# Patient Record
Sex: FEMALE | Race: WHITE | Employment: UNEMPLOYED | URBAN - METROPOLITAN AREA
[De-identification: names, ages, dates, MRNs, and addresses within clinical notes are randomized per-mention and may not be internally consistent; named-entity substitution may affect disease eponyms.]

---

## 2017-09-05 ENCOUNTER — HOSPITAL ENCOUNTER (OUTPATIENT)
Dept: MAMMOGRAPHY | Age: 42
Discharge: HOME | End: 2017-09-05
Payer: COMMERCIAL

## 2017-09-05 DIAGNOSIS — M85.80 OSTEOPENIA: ICD-10-CM

## 2017-09-05 PROCEDURE — 77080 DXA BONE DENSITY AXIAL: CPT

## 2022-01-31 LAB — HBA1C MFR BLD HPLC: 5.4 %

## 2022-10-19 ENCOUNTER — OFFICE VISIT (OUTPATIENT)
Dept: CARDIOLOGY CLINIC | Age: 47
End: 2022-10-19
Payer: COMMERCIAL

## 2022-10-19 ENCOUNTER — TELEPHONE (OUTPATIENT)
Dept: CARDIOLOGY CLINIC | Age: 47
End: 2022-10-19

## 2022-10-19 VITALS
HEIGHT: 68 IN | DIASTOLIC BLOOD PRESSURE: 70 MMHG | OXYGEN SATURATION: 98 % | WEIGHT: 159 LBS | RESPIRATION RATE: 14 BRPM | HEART RATE: 64 BPM | SYSTOLIC BLOOD PRESSURE: 102 MMHG | BODY MASS INDEX: 24.1 KG/M2

## 2022-10-19 DIAGNOSIS — R00.0 TACHYCARDIA: Primary | ICD-10-CM

## 2022-10-19 DIAGNOSIS — C50.919 MALIGNANT NEOPLASM OF FEMALE BREAST, UNSPECIFIED ESTROGEN RECEPTOR STATUS, UNSPECIFIED LATERALITY, UNSPECIFIED SITE OF BREAST (HCC): ICD-10-CM

## 2022-10-19 PROCEDURE — 93000 ELECTROCARDIOGRAM COMPLETE: CPT | Performed by: INTERNAL MEDICINE

## 2022-10-19 PROCEDURE — 99204 OFFICE O/P NEW MOD 45 MIN: CPT | Performed by: INTERNAL MEDICINE

## 2022-10-19 RX ORDER — MULTIVIT-MIN/FOLIC/VIT K/LYCOP 400-300MCG
1 TABLET ORAL DAILY
COMMUNITY

## 2022-10-19 RX ORDER — DESVENLAFAXINE SUCCINATE 50 MG/1
50 TABLET, EXTENDED RELEASE ORAL DAILY
COMMUNITY
Start: 2022-07-14

## 2022-10-19 RX ORDER — LETROZOLE 2.5 MG/1
2.5 TABLET, FILM COATED ORAL DAILY
COMMUNITY
Start: 2022-07-28

## 2022-10-19 NOTE — PATIENT INSTRUCTIONS
1 week event monitor (Blushr) next available, please perform your normal exercise when wearing  Obtain echocardiogram next available    Follow up 4-6 wks after event monitor.

## 2022-10-19 NOTE — PROGRESS NOTES
Cardiac Electrophysiology OFFICE Consultation Note       Assessment/Plan:   1. Tachycardia  -     AMB POC EKG ROUTINE W/ 12 LEADS, INTER & REP  2. Malignant neoplasm of female breast, unspecified estrogen receptor status, unspecified laterality, unspecified site of breast (HCC)       Tachycardia  History of paroxysmal rapid heart rate more than 200 bpm.  Precipitated by exercise. No evidence of preexcitation on EKG. Denies of any syncope. Ongoing episodes with clear precipitating factors exercise. - Spoke to patient in great detail regarding implications of rapid heart rate likely related to paroxysmal SVT. - Obtain echocardiogram to assess structural heart function given history of chemotherapy and radiation therapy  - Obtain 1 week event monitor, I advised her to perform usual exercise to try to capture her arrhythmia  - She could be a candidate for EP study and possible SVT ablation in the future    Breast cancer  Status post prior lumpectomy, chemotherapy, XRT    Subjective:       Kenny Harden is a 52 y.o. patient who is seen for evaluation of palpitations and tachycardia. History of paroxysmal rapid heart rate more than 200 bpm.  Precipitated by exercise. No evidence of preexcitation on EKG. Denies of any syncope. Ongoing episodes with clear precipitating factors exercise. No history of chest pain. 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 letrozole (FEMARA) 2.5 mg tablet Take 2.5 mg by mouth daily.       desvenlafaxine succinate (PRISTIQ) 50 mg ER tablet Take 50 mg by mouth daily. No Known Allergies  Past Medical History:   Diagnosis Date    Arm fracture     X2 as a child    Endometriosis     Pregnancy induced hypertension     Stress fracture of pelvis 2014     left inferior pubic ramus     Past Surgical History:   Procedure Laterality Date    HX HEENT      HX OTHER SURGICAL      Root Canal    HX OTHER SURGICAL      Mole Removal    HX OVARIAN CYST REMOVAL      HX PELVIC LAPAROSCOPY       Family History   Problem Relation Age of Onset    Heart Disease Mother     Diabetes Brother     Depression Father     OSTEOARTHRITIS Father     Osteoporosis Maternal Grandmother      Social History     Tobacco Use    Smoking status: Never    Smokeless tobacco: Never   Substance Use Topics    Alcohol use: Yes     Alcohol/week: 2.5 - 4.2 standard drinks     Types: 3 - 5 Glasses of wine per week        Review of Systems:   12 point review of systems was performed.  All negative except for HPI     Objective:     Visit Vitals  /70 (BP 1 Location: Left upper arm, BP Patient Position: Sitting, BP Cuff Size: Adult)   Pulse 64   Resp 14   Ht 5' 7.92\" (1.725 m)   Wt 159 lb (72.1 kg)   SpO2 98%   BMI 24.23 kg/m²      Physical Exam:   General:  Alert and oriented, in no acute distress  Head:  Atraumatic, normocephalic  Eyes:  extraocular muscles intact  Neck:  Supple, normal range of motion  Lungs:  Clear to auscultation bilaterally, no wheezes/rales/rhonchi   Cardiovascular:  Regular rate and rhythm, normal S1-S2, no murmurs/rubs/gallops  Abdomen:  Soft, nontender, nondistended, normoactive bowel sounds  Skin:  Intact, no rash  Extremities:, no clubbing, cyanosis, or edema  Musculoskeletal: normal range of motion  Neurological:  Alert and oriented, no focal neurologic deficits  Psychiatric:  Normal mood and affect    Lab Results   Component Value Date/Time    Hemoglobin A1c 5.5 10/27/2016 10:29 AM     EKG: Normal sinus rhythm, AV delay with  ms, no evidence of preexcitation        No results found for this or any previous visit. Thank you for involving me in this patient's care and please call with further concerns or questions. ________________________________________  An An Galan MD, [facility], [facility]  Cardiac Electrophysiology  [facility] [place]  [address] [place] [address]                             [phone]     [address] [place] [address].  [address] [place] [address], [address] [facility]  [phone]

## 2022-10-19 NOTE — TELEPHONE ENCOUNTER
Enrolled with [employer] - Ordered and being shipped to patient's home address on file. ETA within 5-7 business days.        Debbora Sailors, RN Oneal Oliva, [facility] Billee Severin    7 day event monitor needed, Lito Alejo

## 2022-10-19 NOTE — PROGRESS NOTES
Room EP 4    Chief Complaint   Patient presents with    Rapid Heart Rate       Visit Vitals  /70 (BP 1 Location: Left upper arm, BP Patient Position: Sitting, BP Cuff Size: Adult)   Pulse 64   Resp 14   Ht 5' 7.92\" (1.725 m)   Wt 159 lb (72.1 kg)   SpO2 98%   BMI 24.23 kg/m²       Family history Mother heart attack, brother has Atrial Fib and an ICD    EKG done today    Chest pain: no    Shortness of breath: no    Edema: no    Palpitations, Skipped beats, Rapid heartbeat: HR greater than 200 with exercise    Dizziness: no    Fatigue:yes    New diagnosis/Surgeries: no    1. Have you been to the ER, urgent care clinic since your last visit? Hospitalized since your last visit? No      2. Have you seen or consulted any other health care providers outside of the [address] [place] [address] since your last visit? Include any pap smears or colon screening.  Yes, oncology @ [facility]    Refills:no

## 2022-11-03 ENCOUNTER — ANCILLARY PROCEDURE (OUTPATIENT)
Dept: CARDIOLOGY CLINIC | Age: 47
End: 2022-11-03
Payer: COMMERCIAL

## 2022-11-03 VITALS
BODY MASS INDEX: 23.55 KG/M2 | DIASTOLIC BLOOD PRESSURE: 84 MMHG | WEIGHT: 159 LBS | SYSTOLIC BLOOD PRESSURE: 122 MMHG | HEIGHT: 69 IN

## 2022-11-03 DIAGNOSIS — C50.919 MALIGNANT NEOPLASM OF FEMALE BREAST, UNSPECIFIED ESTROGEN RECEPTOR STATUS, UNSPECIFIED LATERALITY, UNSPECIFIED SITE OF BREAST (HCC): ICD-10-CM

## 2022-11-03 DIAGNOSIS — R00.0 TACHYCARDIA: ICD-10-CM

## 2022-11-03 PROCEDURE — 93356 MYOCRD STRAIN IMG SPCKL TRCK: CPT | Performed by: INTERNAL MEDICINE

## 2022-11-03 PROCEDURE — 93306 TTE W/DOPPLER COMPLETE: CPT | Performed by: INTERNAL MEDICINE

## 2022-11-04 LAB
ECHO AO ROOT DIAM: 3.2 CM
ECHO AO ROOT INDEX: 1.71 CM/M2
ECHO AV AREA PEAK VELOCITY: 2.8 CM2
ECHO AV AREA VTI: 2.6 CM2
ECHO AV AREA/BSA PEAK VELOCITY: 1.5 CM2/M2
ECHO AV AREA/BSA VTI: 1.4 CM2/M2
ECHO AV MEAN GRADIENT: 4 MMHG
ECHO AV MEAN VELOCITY: 1 M/S
ECHO AV PEAK GRADIENT: 7 MMHG
ECHO AV PEAK VELOCITY: 1.3 M/S
ECHO AV VELOCITY RATIO: 0.77
ECHO AV VTI: 28.7 CM
ECHO EST RA PRESSURE: 3 MMHG
ECHO LA DIAMETER INDEX: 2.14 CM/M2
ECHO LA DIAMETER: 4 CM
ECHO LA TO AORTIC ROOT RATIO: 1.25
ECHO LA VOL 2C: 64 ML (ref 22–52)
ECHO LA VOL 4C: 71 ML (ref 22–52)
ECHO LA VOL BP: 69 ML (ref 22–52)
ECHO LA VOL/BSA BIPLANE: 37 ML/M2 (ref 16–34)
ECHO LA VOLUME AREA LENGTH: 74 ML
ECHO LA VOLUME INDEX A2C: 34 ML/M2 (ref 16–34)
ECHO LA VOLUME INDEX A4C: 38 ML/M2 (ref 16–34)
ECHO LA VOLUME INDEX AREA LENGTH: 40 ML/M2 (ref 16–34)
ECHO LV E' LATERAL VELOCITY: 10 CM/S
ECHO LV E' SEPTAL VELOCITY: 9 CM/S
ECHO LV EDV A2C: 105 ML
ECHO LV EDV A4C: 115 ML
ECHO LV EDV BP: 112 ML (ref 56–104)
ECHO LV EDV INDEX A4C: 61 ML/M2
ECHO LV EDV INDEX BP: 60 ML/M2
ECHO LV EDV NDEX A2C: 56 ML/M2
ECHO LV EJECTION FRACTION A2C: 57 %
ECHO LV EJECTION FRACTION A4C: 55 %
ECHO LV EJECTION FRACTION BIPLANE: 56 % (ref 55–100)
ECHO LV ESV A2C: 45 ML
ECHO LV ESV A4C: 54 ML
ECHO LV ESV BP: 50 ML (ref 19–49)
ECHO LV ESV INDEX A2C: 24 ML/M2
ECHO LV ESV INDEX A4C: 29 ML/M2
ECHO LV ESV INDEX BP: 27 ML/M2
ECHO LV FRACTIONAL SHORTENING: 31 % (ref 28–44)
ECHO LV GLOBAL LONGITUDINAL STRAIN (GLS): -19.8 %
ECHO LV INTERNAL DIMENSION DIASTOLE INDEX: 2.62 CM/M2
ECHO LV INTERNAL DIMENSION DIASTOLIC: 4.9 CM (ref 3.9–5.3)
ECHO LV INTERNAL DIMENSION SYSTOLIC INDEX: 1.82 CM/M2
ECHO LV INTERNAL DIMENSION SYSTOLIC: 3.4 CM
ECHO LV IVSD: 0.8 CM (ref 0.6–0.9)
ECHO LV MASS 2D: 153 G (ref 67–162)
ECHO LV MASS INDEX 2D: 81.8 G/M2 (ref 43–95)
ECHO LV POSTERIOR WALL DIASTOLIC: 1 CM (ref 0.6–0.9)
ECHO LV RELATIVE WALL THICKNESS RATIO: 0.41
ECHO LVOT AREA: 3.8 CM2
ECHO LVOT AV VTI INDEX: 0.7
ECHO LVOT CARDIAC OUTPUT: 3.7 LITER/MINUTE
ECHO LVOT CARDIAC OUTPUT: 3.7 LITER/MINUTE
ECHO LVOT CARDIAC OUTPUT: 3.8 LITER/MINUTE
ECHO LVOT CARDIAC OUTPUT: 3.8 LITER/MINUTE
ECHO LVOT CARDIAC OUTPUT: 4 LITER/MINUTE
ECHO LVOT CARDIAC OUTPUT: 4 LITER/MINUTE
ECHO LVOT DIAM: 2.2 CM
ECHO LVOT MEAN GRADIENT: 2 MMHG
ECHO LVOT PEAK GRADIENT: 4 MMHG
ECHO LVOT PEAK VELOCITY: 1 M/S
ECHO LVOT STROKE VOLUME INDEX: 40.6 ML/M2
ECHO LVOT SV: 76 ML
ECHO LVOT VTI: 20 CM
ECHO MV A VELOCITY: 0.76 M/S
ECHO MV AREA PHT: 3.5 CM2
ECHO MV E DECELERATION TIME (DT): 217.4 MS
ECHO MV E VELOCITY: 0.92 M/S
ECHO MV E/A RATIO: 1.21
ECHO MV E/E' LATERAL: 9.2
ECHO MV E/E' RATIO (AVERAGED): 9.71
ECHO MV E/E' SEPTAL: 10.22
ECHO MV PRESSURE HALF TIME (PHT): 63 MS
ECHO RIGHT VENTRICULAR SYSTOLIC PRESSURE (RVSP): 24 MMHG
ECHO RV TAPSE: 2 CM (ref 1.7–?)
ECHO TV REGURGITANT MAX VELOCITY: 2.31 M/S
ECHO TV REGURGITANT PEAK GRADIENT: 21 MMHG

## 2022-11-30 ENCOUNTER — OFFICE VISIT (OUTPATIENT)
Dept: CARDIOLOGY CLINIC | Age: 47
End: 2022-11-30
Payer: COMMERCIAL

## 2022-11-30 VITALS
SYSTOLIC BLOOD PRESSURE: 118 MMHG | WEIGHT: 155.8 LBS | BODY MASS INDEX: 23.08 KG/M2 | HEART RATE: 60 BPM | DIASTOLIC BLOOD PRESSURE: 80 MMHG | RESPIRATION RATE: 16 BRPM | OXYGEN SATURATION: 98 % | HEIGHT: 69 IN

## 2022-11-30 DIAGNOSIS — C50.919 MALIGNANT NEOPLASM OF FEMALE BREAST, UNSPECIFIED ESTROGEN RECEPTOR STATUS, UNSPECIFIED LATERALITY, UNSPECIFIED SITE OF BREAST (HCC): ICD-10-CM

## 2022-11-30 DIAGNOSIS — I47.1 SVT (SUPRAVENTRICULAR TACHYCARDIA) (HCC): Primary | ICD-10-CM

## 2022-11-30 DIAGNOSIS — Z01.812 PRE-PROCEDURE LAB EXAM: ICD-10-CM

## 2022-11-30 PROBLEM — I47.10 SVT (SUPRAVENTRICULAR TACHYCARDIA): Status: ACTIVE | Noted: 2022-10-19

## 2022-11-30 NOTE — PATIENT INSTRUCTIONS
Scheduled for electrophysiologic study and possible SVT ablation  Obtain blood work prior to procedure  Follow-up with Dr. Golden Alvarado 1 month post ablation        You are scheduled for the following procedure with Dr. Golden Alvarado:  EP Study and possible SVT ablation at [place], [address] [place] [address], [address] [place] [address]        PLEASE be aware that your procedure date/time is tentative and subject to change due to emergency cases. Procedure date/time:    January 23, 2023  Time: 10:30 am - please arrive by  8:30 am      ARRIVAL time:  (You will need  to be discharged home with.)     [X]  [facility] procedures: please arrive to check in on 2nd floor two hours prior to your procedure the day of your procedure. Pre-procedure Labs:    PRE-PROCEDURE LABS NEEDED: YES   Forms for labwork may be given at appointment. If not, they will be mailed to you. Labs should be completed within 5-7 days of procedure. These can be done at any [email or web address] or [email or web address].         [address]  [address]  Monday-Friday 730A-430P (closed [id number])  [phone]    [facility]  [address] Lonnie[address]teria  Monday-Friday 7A-5P  [address], [facility], [address] [place] [address]  Evelina Majano [address]  Monday-Friday 821P-389S  [phone] (closed 1-2P)    [facility] , [address] [facility] [address],8Th [address]  [place], [address]  Monday-Friday 730A-430P (NKTYLL [id number])  [phone]    [address]  [address], [address]  Monday-Friday 7A-430P  [address] [facility], [address]  Monday-Friday 730A-430P (closed 1-2P)  [phone]            Medication Instructions:    Please do not stop or hold any medications prior to your procedure. Nothing to eat or drink after midnight before your procedure. You may take all other medications as directed the morning of your procedure with a sip of water unless otherwise indicated. Please contact the office [phone] and ask for a member of Dr. Samule Bence team for procedure questions. There is a physician on call for the office after hours for immediate needs. FOLLOW UP:   Your appointments will be made for you post procedure based off of discharge instructions. You may have driving restrictions for a short time after your procedure (usually 2-3 days). Pacemaker/ICD patients will be unable to have an MRI until 6 weeks after implant, NO dental work for 8 weeks after your implant. SVT Ablation   Discharge Instructions      You have just had an SVT Ablation. There were catheters temporarily placed in your heart through a puncture in the veins in your groin. WHAT TO EXPECT     Mild to moderate, non-painful, bruising or mild swelling at the puncture site is not un-common, and will resolve in 7 - 14 days, and may extend down your thigh as it heals. Application of Ice to the site may help with any tenderness. You have a small gauze dressing applied to the puncture site in your groin. You may remove this the following morning. It is not uncommon to feel palpitations during the healing phase after your ablation you're your palpitations last longer than 30 minutes, or you have recurrent tachycardia for a prolonged time, please contact the office. MEDICATIONS     Take only the medications prescribed to you at discharge. ACTIVITY     A responsible adult must take you home. Do not drive a car for 24 hours. Rest quietly for the remainder of the day. Do not lift anything greater than 10 pounds for 7 days. Limit bending at the puncture site and use of stairs for at least 2 days.   You may remove the bandage and shower the morning after the procedure. Do not take a bath for 3 days. SYMPTOMS THAT NEED TO BE REPORTED IMMEDIATELY     Bleeding at the puncture site. If there is bleeding, lie down and hold firm direct pressure for at least 5 minutes. If the bleeding does not stop, go to the closest emergency room, or call 911. Temperature more than 100.5 F. Redness or warmth at the puncture site. Increasing pain, numbness, coolness or blue discoloration of the extremity where the puncture is located. Pulsating mass at the puncture site. A new lump at the puncture site, or increasing swelling at the site. Please be aware that a pea or marble sized lump is normal, anything larger or increasing in size should be reported. Bruising at the puncture site that enlarges or becomes painful (some bruising at the site is common and will go away in 7-14 days). Dizziness, lightheadedness, fainting. REMEMBER: If you feel something is an emergency or cannot be handled over the phone, call 911 or go to the closest emergency room.       [facility]     Dr. Andreina Walters in 1 month  Domenic Leong NP in 3 months        Gena Fitzgerald MD  Cardiac Electrophysiology / Cardiology    [address]  [facility], [address]  [phone] / [phone] Fax  [phone] / [phone] Fax

## 2022-11-30 NOTE — PROGRESS NOTES
Room #: 2    No complaints today. Chief Complaint   Patient presents with    Follow-up     6 weeks    Rapid Heart Rate       Visit Vitals  /80 (BP 1 Location: Left upper arm, BP Patient Position: Sitting, BP Cuff Size: Adult)   Pulse 60   Resp 16   Ht 5' 9\" (1.753 m)   Wt 155 lb 12.8 oz (70.7 kg)   SpO2 98%   BMI 23.01 kg/m²         Chest pain:  NO  Shortness of breath:  NO  Edema: NO  Palpitations, skipped beats, rapid heartbeat:  NO  Dizziness:  NO    1. Have you been to the ER, urgent care clinic since your last visit? Hospitalized since your last visit? NO    2. Have you seen or consulted any other health care providers outside of the [address] [place] [address] since your last visit? Include any pap smears or colon screening.  NO      Refills:  NO

## 2022-11-30 NOTE — PROGRESS NOTES
Cardiac Electrophysiology OFFICE Consultation Note       Assessment/Plan:   1. SVT (supraventricular tachycardia) ([facility]  2. Malignant neoplasm of female breast, unspecified estrogen receptor status, unspecified laterality, unspecified site of breast ([facility]       SVT  History of paroxysmal rapid heart rate more than 200 bpm.  Precipitated by exercise. No evidence of preexcitation on EKG. Denies of any syncope. Ongoing episodes with clear precipitating factors exercise.  -Event monitor in November demonstrated paroxysmal episodes of narrow complex tachycardia/SVT with heart rate at 194 bpm.  Typically precipitated by exercise. This is limiting her functional capacity. - Spoke to patient in great detail regarding implications of rapid heart rate likely related to paroxysmal SVT. - TTE from 11/3/22 demonstrated LVEF of 55-60%, mild MR, mild LA dilation  - I have discussed in detail the indications, alternatives, benefits and risks of an electrophysiologic study and radiofrequency ablation for SVT. The risks that were discussed included but not limited to bleeding, pericarditis, damage to the electrical conduction system potentially requiring a pacemaker, cardiac tamponade, ventricular arrhythmias, stroke, MI, and death. Patient reports complete understanding of discussions and recommendations and wish to proceed with the procedure. - Schedule for EP study possible SVT ablation  - Obtain blood work prior to procedure    Breast cancer  Status post prior lumpectomy, chemotherapy, XRT    Subjective:       Megan Rosado is a 52 y.o. patient who is seen for evaluation of palpitations and tachycardia. History of paroxysmal rapid heart rate more than 200 bpm.  Precipitated by exercise. No evidence of preexcitation on EKG. Denies of any syncope. Ongoing episodes with clear precipitating factors exercise. No history of chest pain.     Event monitor in November demonstrated paroxysmal episodes of narrow complex tachycardia/SVT with heart rate at 194 bpm.      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 letrozole (FEMARA) 2.5 mg tablet Take 2.5 mg by mouth daily. desvenlafaxine succinate (PRISTIQ) 50 mg ER tablet Take 50 mg by mouth daily. No Known Allergies  Past Medical History:   Diagnosis Date    Arm fracture     X2 as a child    Endometriosis     Pregnancy induced hypertension     Stress fracture of pelvis 2014     left inferior pubic ramus     Past Surgical History:   Procedure Laterality Date    HX HEENT      HX OTHER SURGICAL      Root Canal    HX OTHER SURGICAL      Mole Removal    HX OVARIAN CYST REMOVAL      HX PELVIC LAPAROSCOPY       Family History   Problem Relation Age of Onset    Heart Disease Mother     Diabetes Brother     Depression Father     OSTEOARTHRITIS Father     Osteoporosis Maternal Grandmother      Social History     Tobacco Use    Smoking status: Never    Smokeless tobacco: Never   Substance Use Topics    Alcohol use: Yes     Alcohol/week: 2.5 - 4.2 standard drinks     Types: 3 - 5 Glasses of wine per week        Review of Systems:   12 point review of systems was performed.  All negative except for HPI     Objective:     Visit Vitals  /80 (BP 1 Location: Left upper arm, BP Patient Position: Sitting, BP Cuff Size: Adult)   Pulse 60   Resp 16   Ht 5' 9\" (1.753 m)   Wt 155 lb 12.8 oz (70.7 kg)   SpO2 98%   BMI 23.01 kg/m²        Physical Exam:   General:  Alert and oriented, in no acute distress  Head:  Atraumatic, normocephalic  Eyes:  extraocular muscles intact  Neck:  Supple, normal range of motion  Lungs:  Clear to auscultation bilaterally, no wheezes/rales/rhonchi   Cardiovascular:  Regular rate and rhythm, normal S1-S2, no murmurs/rubs/gallops  Abdomen:  Soft, nontender, nondistended, normoactive bowel sounds  Skin:  Intact, no rash  Extremities:, no clubbing, cyanosis, or edema  Musculoskeletal: normal range of motion  Neurological:  Alert and oriented, no focal neurologic deficits  Psychiatric:  Normal mood and affect    Lab Results   Component Value Date/Time    Hemoglobin A1c 5.5 10/27/2016 10:29 AM     EKG: Normal sinus rhythm, AV delay with  ms, no evidence of preexcitation        No results found for this or any previous visit. Thank you for involving me in this patient's care and please call with further concerns or questions. ________________________________________  An Katia Early MD, [facility], [facility]  Cardiac Electrophysiology  [facility] [place]  [address] [place] [address] Avenue                             [phone]     [facility].  [address] [place] [address], [address] [facility]  [phone]

## 2023-01-09 ENCOUNTER — PREP FOR PROCEDURE (OUTPATIENT)
Dept: CARDIOLOGY CLINIC | Age: 48
End: 2023-01-09

## 2023-01-09 RX ORDER — SODIUM CHLORIDE 0.9 % (FLUSH) 0.9 %
5-40 SYRINGE (ML) INJECTION EVERY 8 HOURS
OUTPATIENT
Start: 2023-01-09

## 2023-01-09 RX ORDER — SODIUM CHLORIDE 0.9 % (FLUSH) 0.9 %
5-40 SYRINGE (ML) INJECTION AS NEEDED
OUTPATIENT
Start: 2023-01-09

## 2023-01-20 ENCOUNTER — TELEPHONE (OUTPATIENT)
Dept: CARDIOLOGY CLINIC | Age: 48
End: 2023-01-20

## 2023-01-20 DIAGNOSIS — Z01.812 PRE-PROCEDURE LAB EXAM: ICD-10-CM

## 2023-01-20 DIAGNOSIS — I47.1 SVT (SUPRAVENTRICULAR TACHYCARDIA) (HCC): ICD-10-CM

## 2023-01-20 NOTE — TELEPHONE ENCOUNTER
Patient said she would like to know if she gets her labs done today will it be okay because she was suppose to have labs done 5-7 days before the procedure    Pt is schedule to have surgery morning on 1/23/23. Tennille nurse said that if she goes to get her Labs today she would be fine for Monday.     [phone]

## 2023-01-21 LAB
ANION GAP SERPL CALC-SCNC: 2 MMOL/L (ref 5–15)
BUN SERPL-MCNC: 10 MG/DL (ref 6–20)
BUN/CREAT SERPL: 15 (ref 12–20)
CALCIUM SERPL-MCNC: 9.6 MG/DL (ref 8.5–10.1)
CHLORIDE SERPL-SCNC: 105 MMOL/L (ref 97–108)
CO2 SERPL-SCNC: 32 MMOL/L (ref 21–32)
CREAT SERPL-MCNC: 0.66 MG/DL (ref 0.55–1.02)
ERYTHROCYTE [DISTWIDTH] IN BLOOD BY AUTOMATED COUNT: 12 % (ref 11.5–14.5)
GLUCOSE SERPL-MCNC: 91 MG/DL (ref 65–100)
HCT VFR BLD AUTO: 41.3 % (ref 35–47)
HGB BLD-MCNC: 13.3 G/DL (ref 11.5–16)
MCH RBC QN AUTO: 30.9 PG (ref 26–34)
MCHC RBC AUTO-ENTMCNC: 32.2 G/DL (ref 30–36.5)
MCV RBC AUTO: 95.8 FL (ref 80–99)
NRBC # BLD: 0 K/UL (ref 0–0.01)
NRBC BLD-RTO: 0 PER 100 WBC
PLATELET # BLD AUTO: 223 K/UL (ref 150–400)
PMV BLD AUTO: 10.4 FL (ref 8.9–12.9)
POTASSIUM SERPL-SCNC: 4.6 MMOL/L (ref 3.5–5.1)
RBC # BLD AUTO: 4.31 M/UL (ref 3.8–5.2)
SODIUM SERPL-SCNC: 139 MMOL/L (ref 136–145)
WBC # BLD AUTO: 4 K/UL (ref 3.6–11)

## 2023-01-23 ENCOUNTER — HOSPITAL ENCOUNTER (OUTPATIENT)
Age: 48
Setting detail: OUTPATIENT SURGERY
Discharge: HOME | End: 2023-01-23
Attending: INTERNAL MEDICINE | Admitting: INTERNAL MEDICINE
Payer: COMMERCIAL

## 2023-01-23 VITALS
WEIGHT: 152.9 LBS | BODY MASS INDEX: 22.64 KG/M2 | SYSTOLIC BLOOD PRESSURE: 119 MMHG | OXYGEN SATURATION: 99 % | HEART RATE: 83 BPM | HEIGHT: 69 IN | TEMPERATURE: 97.9 F | RESPIRATION RATE: 18 BRPM | DIASTOLIC BLOOD PRESSURE: 80 MMHG

## 2023-01-23 DIAGNOSIS — I47.1 PAROXYSMAL SUPRAVENTRICULAR TACHYCARDIA (HCC): ICD-10-CM

## 2023-01-23 PROCEDURE — C1894 INTRO/SHEATH, NON-LASER: HCPCS | Performed by: INTERNAL MEDICINE

## 2023-01-23 PROCEDURE — C1760 CLOSURE DEV, VASC: HCPCS | Performed by: INTERNAL MEDICINE

## 2023-01-23 PROCEDURE — 93005 ELECTROCARDIOGRAM TRACING: CPT

## 2023-01-23 PROCEDURE — 74011250637 HC RX REV CODE- 250/637: Performed by: INTERNAL MEDICINE

## 2023-01-23 PROCEDURE — 99152 MOD SED SAME PHYS/QHP 5/>YRS: CPT | Performed by: INTERNAL MEDICINE

## 2023-01-23 PROCEDURE — 76937 US GUIDE VASCULAR ACCESS: CPT | Performed by: INTERNAL MEDICINE

## 2023-01-23 PROCEDURE — C1732 CATH, EP, DIAG/ABL, 3D/VECT: HCPCS | Performed by: INTERNAL MEDICINE

## 2023-01-23 PROCEDURE — 74011250636 HC RX REV CODE- 250/636: Performed by: INTERNAL MEDICINE

## 2023-01-23 PROCEDURE — C1730 CATH, EP, 19 OR FEW ELECT: HCPCS | Performed by: INTERNAL MEDICINE

## 2023-01-23 PROCEDURE — 99153 MOD SED SAME PHYS/QHP EA: CPT | Performed by: INTERNAL MEDICINE

## 2023-01-23 PROCEDURE — C1733 CATH, EP, OTHR THAN COOL-TIP: HCPCS | Performed by: INTERNAL MEDICINE

## 2023-01-23 PROCEDURE — 93653 COMPRE EP EVAL TX SVT: CPT | Performed by: INTERNAL MEDICINE

## 2023-01-23 PROCEDURE — 93613 INTRACARDIAC EPHYS 3D MAPG: CPT | Performed by: INTERNAL MEDICINE

## 2023-01-23 PROCEDURE — 77030027107 HC PTCH EXT REF CARTO3 J&J -F: Performed by: INTERNAL MEDICINE

## 2023-01-23 PROCEDURE — 77030003390 HC NDL ANGI MRTM -A: Performed by: INTERNAL MEDICINE

## 2023-01-23 PROCEDURE — 77030018729 HC ELECTRD DEFIB PAD CARD -B: Performed by: INTERNAL MEDICINE

## 2023-01-23 PROCEDURE — 77030010869 HC CBL EP ABL STJU -B: Performed by: INTERNAL MEDICINE

## 2023-01-23 PROCEDURE — 93623 PRGRMD STIMJ&PACG IV RX NFS: CPT | Performed by: INTERNAL MEDICINE

## 2023-01-23 PROCEDURE — 74011000250 HC RX REV CODE- 250: Performed by: INTERNAL MEDICINE

## 2023-01-23 RX ORDER — FENTANYL CITRATE 50 UG/ML
INJECTION, SOLUTION INTRAMUSCULAR; INTRAVENOUS AS NEEDED
Status: DISCONTINUED | OUTPATIENT
Start: 2023-01-23 | End: 2023-01-23 | Stop reason: HOSPADM

## 2023-01-23 RX ORDER — MIDAZOLAM HYDROCHLORIDE 1 MG/ML
INJECTION, SOLUTION INTRAMUSCULAR; INTRAVENOUS AS NEEDED
Status: DISCONTINUED | OUTPATIENT
Start: 2023-01-23 | End: 2023-01-23 | Stop reason: HOSPADM

## 2023-01-23 RX ORDER — OXYCODONE AND ACETAMINOPHEN 5; 325 MG/1; MG/1
1 TABLET ORAL
Status: DISCONTINUED | OUTPATIENT
Start: 2023-01-23 | End: 2023-01-23 | Stop reason: HOSPADM

## 2023-01-23 RX ORDER — SODIUM CHLORIDE 0.9 % (FLUSH) 0.9 %
5-40 SYRINGE (ML) INJECTION EVERY 8 HOURS
Status: DISCONTINUED | OUTPATIENT
Start: 2023-01-23 | End: 2023-01-23 | Stop reason: HOSPADM

## 2023-01-23 RX ORDER — LIDOCAINE HYDROCHLORIDE 10 MG/ML
INJECTION INFILTRATION; PERINEURAL AS NEEDED
Status: DISCONTINUED | OUTPATIENT
Start: 2023-01-23 | End: 2023-01-23 | Stop reason: HOSPADM

## 2023-01-23 RX ORDER — ASPIRIN 325 MG
325 TABLET ORAL DAILY
Qty: 30 TABLET | Refills: 0 | Status: SHIPPED | OUTPATIENT
Start: 2023-01-23 | End: 2023-02-22

## 2023-01-23 RX ORDER — SODIUM CHLORIDE 0.9 % (FLUSH) 0.9 %
5-40 SYRINGE (ML) INJECTION AS NEEDED
Status: DISCONTINUED | OUTPATIENT
Start: 2023-01-23 | End: 2023-01-23 | Stop reason: HOSPADM

## 2023-01-23 RX ORDER — ONDANSETRON 2 MG/ML
INJECTION INTRAMUSCULAR; INTRAVENOUS AS NEEDED
Status: DISCONTINUED | OUTPATIENT
Start: 2023-01-23 | End: 2023-01-23 | Stop reason: HOSPADM

## 2023-01-23 RX ORDER — ACETAMINOPHEN 325 MG/1
650 TABLET ORAL
Status: DISCONTINUED | OUTPATIENT
Start: 2023-01-23 | End: 2023-01-23 | Stop reason: HOSPADM

## 2023-01-23 RX ADMIN — ACETAMINOPHEN 650 MG: 325 TABLET ORAL at 16:07

## 2023-01-23 NOTE — Clinical Note
EP catheter inserted in the His. Pt states she is hungry. Pt provided food tray (turkey sandwich), pt did not like. Pt provided chicken salad sandwich and water. Pt provided night time medications. Pharmacy called for medication (Vitamin D, Nicotine patch, and Trazadone) that is not available on the unit. Spoke with pharmacist True that states the medication will be sent to the area as soon as it is available. pending medication at this time.

## 2023-01-23 NOTE — Clinical Note
Bilateral groin prepped with ChloraPrep and draped. Wet prep solution applied at: 1223. Wet prep solution dried at: 1226. Wet prep elapsed drying time: 3 mins.

## 2023-01-23 NOTE — PROCEDURES
ATRIOVENTRICULAR DUY REENTRANT TACHYCARDIA ABLATION      Procedure Date: 01/23/23  Lab Physician: Smiley Christianson MD    Indications:  51 yo F with a history of breast CA s/p prior XRT/chemotherapy/surgery, with exercise induced SVT with Hrat 194 bpm now referred for EP study and SVT ablation. PROCEDURE NARRATIVE:  After informed consent was obtained, the patient was brought to the electrophysiology laboratory in the fasting state, and prepped and draped in the usual sterile manner. Local anesthetic was delivered to both groins, and sheaths and catheters were placed in the heart via the femoral veins under fluoroscopic guidance, as described below. Conscious sedation was administered by the anesthesia and nursing staff independent of those performing the procedure under my supervision with intermittent dosing of anxiolytics and narcotics. SHEATH INSERTION AND ULTRASOUND-GUIDED ACCESS  Both groins were infiltrated with Lidocaine (1%) for local anesthesia. All sheaths were placed using the modified Seldinger technique with ultrasound guided assistance (ultrasound evaluation of possible access sites. Patency of the selected vessel. Realtime visualization of the vascular needle entry was performed). Any long sheaths used were advanced to the heart over a guidewire using fluoroscopic guidance. A 8F and 6F sheaths were inserted into the left femoral vein (LVF). An 8F and 6F sheaths were inserted into the right femoral vein (RFV). CATHETER INSERTION  Catheters were advanced to the following positions using fluoroscopic guidance:  RA: 6Fr Daig Quadripolar catheter  RV: 6Fr Daig Quadripolar catheter  His Bundle: 6Fr CRD2 Catheter  Coronary Sinus: Biosense steerable decapolar catheter  Ablation: 4mm [employer] non-irrigated Gibran ablation catheter    ELECTROPHYSIOLOGIC STUDY (EPS)  The baseline ECG revealed sinus rhythm.  A complete EPS was performed with programmed and incremental stimulation in the RA and RV at least twice the diastolic pacing threshold. VA conduction was present, concentric and decremental. Dual AV nikki physiology was present evident by inducible typical and atypical AV nikki echo and cross-over during burst pacing from the CS. Evidence of an accessory pathway was not present.  ms  HV 44 ms  AV nikki wenckebach cycle length (AVNWB CL) 470 ms  VAWB  ms  Anterograde AV Nikki Effective Refractory Period (AVNERP) 600/480 ms  VAERP 600/270 ms  Atrial ERP (AERP) 600/220 ms  Ventricular ERP (VERP) <600/270 ms    ARRHYTHMIAS OBSERVED:  With aggressive burst pacing from HRA and pCS down to 250 ms and with program stimulation with up to triple of atrial extrastimuli down to 400/200/200/200 ms on and off of isoproterenol demonstrated clear evidence of dual AV nikki physiology. There was no inducible sustained AVNRT, however, there was inducible typical and atypical AV nikki echo. There was cross-over with burst pacing from the atrium. There was a clear AV nikki echo window of 50 ms pre-ablation. Given evidence of SVT captured on monitor at 194 bpm and evidence of dual AV nikki physiology, the decision was proceed with slow pathway modification ablation. ABLATION  Using a Bios[employer] Life 4mm ablation catheter and [employer] CARTO 3D mapping system a \"His cloud\" map was created to andrez the His region. The CS os was identified and the [employer] was defined. RF ablation was then performed in the slow pathway region in [employer], anterior to the os of the CS and inferior to the His bundle. Brief run of accelerated junctional rhythm was seen with application of RF without evidence of VA block. Total of 3 ablation lesions were performed for total ablation time of 1 min and 55 seconds.     POST ABLATION TESTING  Post ablation, after a waiting period of >15 minutes, program stimulation was performed with up to triple atrial extrastimuli from the pCS on and off of Isuprel (up to 8 mcg/min) down to 400/200/200/200 ms without an AH jump or inducible arrhythmia. There was an echo window of 10  ms. Final AVNWB was 390 ms. VA conduction was present and concentric and VAWB was 290 ms. Final AH 95 ms and HV 48 ms. At the termination of the procedure, the catheters and sheaths were removed, and hemostasis was achieved with Perclose and manual pressure held until hemostasis was obtained. The patient tolerated the procedure well, and left the EP lab in good condition. Conscious sedation was administered, and appropriate monitoring performed, by members of the EP nursing staff. CONCLUSIONS  1. 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 2. No inducible AVNRTfollowing ablation after a waiting period and with Isuprel administration with echo window of 10 ms.  3. Normal His-Purkinje system. RECOMMENDATIONS  1. Bedrest x 2 hours. 2. Aspirin 325 mg daily x 1 month. 3. Discharge home. 4. Outpatient follow up with Dr. Betty De Souza in 1 month.

## 2023-01-23 NOTE — H&P
Cardiac Electrophysiology OFFICE Consultation Note     Interval H&P:  No significant interval changes since patient was last seen in clinic. I have discussed in detail the indications, alternatives, benefits and risks of an electrophysiologic study and radiofrequency ablation for SVT. The risks that were discussed included but not limited to bleeding, pericarditis, damage to the electrical conduction system potentially requiring a pacemaker, cardiac tamponade, ventricular arrhythmias, stroke, MI, and death. Patient reports complete understanding of discussions and recommendations and wish to proceed with the procedure.          _________________________________  An Estela Mauricio MD, [facility], [facility]  Cardiac Electrophysiology  [facility] Heart and Vascular [place]      Assessment/Plan:   1. SVT (supraventricular tachycardia) ([facility] [address]  2. Malignant neoplasm of female breast, unspecified estrogen receptor status, unspecified laterality, unspecified site of breast ([facility] [address]       SVT  History of paroxysmal rapid heart rate more than 200 bpm.  Precipitated by exercise. No evidence of preexcitation on EKG. Denies of any syncope. Ongoing episodes with clear precipitating factors exercise.  -Event monitor in November demonstrated paroxysmal episodes of narrow complex tachycardia/SVT with heart rate at 194 bpm.  Typically precipitated by exercise. This is limiting her functional capacity. - Spoke to patient in great detail regarding implications of rapid heart rate likely related to paroxysmal SVT. - TTE from 11/3/22 demonstrated LVEF of 55-60%, mild MR, mild LA dilation  - I have discussed in detail the indications, alternatives, benefits and risks of an electrophysiologic study and radiofrequency ablation for SVT.   The risks that were discussed included but not limited to bleeding, pericarditis, damage to the electrical conduction system potentially requiring a pacemaker, cardiac tamponade, ventricular arrhythmias, stroke, MI, and death. Patient reports complete understanding of discussions and recommendations and wish to proceed with the procedure. - Schedule for EP study possible SVT ablation  - Obtain blood work prior to procedure    Breast cancer  Status post prior lumpectomy, chemotherapy, XRT    Subjective:       Cleophus Severance is a 52 y.o. patient who is seen for evaluation of palpitations and tachycardia. History of paroxysmal rapid heart rate more than 200 bpm.  Precipitated by exercise. No evidence of preexcitation on EKG. Denies of any syncope. Ongoing episodes with clear precipitating factors exercise. No history of chest pain. Event monitor in November demonstrated paroxysmal episodes of narrow complex tachycardia/SVT with heart rate at 194 bpm.      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 letrozole (FEMARA) 2.5 mg tablet Take 2.5 mg by mouth daily. desvenlafaxine succinate (PRISTIQ) 50 mg ER tablet Take 50 mg by mouth daily.        No Known Allergies  Past Medical History:   Diagnosis Date    Arm fracture     X2 as a child    Endometriosis     Pregnancy induced hypertension     Stress fracture of pelvis 2014     left inferior pubic ramus     Past Surgical History:   Procedure Laterality Date    HX HEENT      HX OTHER SURGICAL      Root Canal    HX OTHER SURGICAL      Mole Removal    HX OVARIAN CYST REMOVAL      HX PELVIC LAPAROSCOPY       Family History   Problem Relation Age of Onset    Heart Disease Mother     Diabetes Brother     Depression Father OSTEOARTHRITIS Father     Osteoporosis Maternal Grandmother      Social History     Tobacco Use    Smoking status: Never    Smokeless tobacco: Never   Substance Use Topics    Alcohol use: Yes     Alcohol/week: 2.5 - 4.2 standard drinks     Types: 3 - 5 Glasses of wine per week        Review of Systems:   12 point review of systems was performed. All negative except for HPI     Objective:     Visit Vitals  /80 (BP 1 Location: Left upper arm, BP Patient Position: Sitting, BP Cuff Size: Adult)   Pulse 60   Resp 16   Ht 5' 9\" (1.753 m)   Wt 155 lb 12.8 oz (70.7 kg)   SpO2 98%   BMI 23.01 kg/m²        Physical Exam:   General:  Alert and oriented, in no acute distress  Head:  Atraumatic, normocephalic  Eyes:  extraocular muscles intact  Neck:  Supple, normal range of motion  Lungs:  Clear to auscultation bilaterally, no wheezes/rales/rhonchi   Cardiovascular:  Regular rate and rhythm, normal S1-S2, no murmurs/rubs/gallops  Abdomen:  Soft, nontender, nondistended, normoactive bowel sounds  Skin:  Intact, no rash  Extremities:, no clubbing, cyanosis, or edema  Musculoskeletal: normal range of motion  Neurological:  Alert and oriented, no focal neurologic deficits  Psychiatric:  Normal mood and affect    Lab Results   Component Value Date/Time    Hemoglobin A1c 5.5 10/27/2016 10:29 AM     EKG: Normal sinus rhythm, AV delay with  ms, no evidence of preexcitation        No results found for this or any previous visit. Thank you for involving me in this patient's care and please call with further concerns or questions. ________________________________________  An Saurabh Covington MD, [facility], [facility]  Cardiac Electrophysiology  [facility] and Vascular [place]  [address] [place] [address]                             [phone]     [facility].  [address] [facility], [address] [facility]  [phone]

## 2023-01-23 NOTE — DISCHARGE INSTRUCTIONS
SVT Ablation   Discharge Instructions      You have just had an SVT Ablation. There were catheters temporarily placed in your heart through a puncture in the veins in your groin. WHAT TO EXPECT     Mild to moderate, non-painful, bruising or mild swelling at the puncture site is not un-common, and will resolve in 7 - 14 days, and may extend down your thigh as it heals. Application of Ice to the site may help with any tenderness. You have a small gauze dressing applied to the puncture site in your groin. You may remove this the following morning. It is not uncommon to feel palpitations during the healing phase after your ablation you're your palpitations last longer than 30 minutes, or you have recurrent tachycardia for a prolonged time, please contact the office. MEDICATIONS     Start Aspirin 325 mg daily x 1month  Take only the medications prescribed to you at discharge. ACTIVITY     A responsible adult must take you home. Do not drive a car for 24 hours. Rest quietly for the remainder of the day. Do not lift anything greater than 10 pounds for 7 days. Limit bending at the puncture site and use of stairs for at least 2 days. You may remove the bandage and shower the morning after the procedure. Do not take a bath for 3 days. SYMPTOMS THAT NEED TO BE REPORTED IMMEDIATELY     Bleeding at the puncture site. If there is bleeding, lie down and hold firm direct pressure for at least 5 minutes. If the bleeding does not stop, go to the closest emergency room, or call 911. Temperature more than 100.5 F. Redness or warmth at the puncture site. Increasing pain, numbness, coolness or blue discoloration of the extremity where the puncture is located. Pulsating mass at the puncture site. A new lump at the puncture site, or increasing swelling at the site.  Please be aware that a pea or marble sized lump is normal, anything larger or increasing in size should be reported. Bruising at the puncture site that enlarges or becomes painful (some bruising at the site is common and will go away in 7-14 days). Dizziness, lightheadedness, fainting. REMEMBER: If you feel something is an emergency or cannot be handled over the phone, call 911 or go to the closest emergency room.       Autumn Villa in 1 month  Cheyenne Zendejas NP in 3 months        Isreal Ladd MD  Cardiac Electrophysiology / Cardiology    [address]  [address] [facility], [facility], [address] [facility]  [phone] / [phone] Fax  [phone] / [phone] Fax

## 2023-01-23 NOTE — Clinical Note
Right femoral vein. Accessed successfully. Femoral access needle used. Using fluoro and ultrasound guidance.  Number of attempts =  1.

## 2023-01-23 NOTE — Clinical Note
Left femoral vein. Accessed successfully. Femoral access needle used. Using fluoro and ultrasound guidance.

## 2023-01-23 NOTE — ROUTINE PROCESS
Received patient from waiting area. Armband and allergies verbally confirmed with patient. Procedure explained and consents signed. 1205: TRANSFER - OUT REPORT:    Verbal report given to [facility] RN(name) on [facility]  being transferred to EP lab(unit) for ordered procedure       Report consisted of patients Situation, Background, Assessment and   Recommendations(SBAR). Information from the following report(s) SBAR was reviewed with the receiving nurse. Lines:   Peripheral IV 01/23/23 Left Antecubital (Active)   Site Assessment Clean, dry, & intact 01/23/23 0923   Phlebitis Assessment 0 01/23/23 0923   Infiltration Assessment 0 01/23/23 0923   Dressing Status Clean, dry, & intact 01/23/23 0923   Dressing Type Transparent 01/23/23 0923   Hub Color/Line Status Blue;Flushed; Infusing 01/23/23 8346        Opportunity for questions and clarification was provided. Patient transported with:   Registered Nurse    [phone]: TRANSFER - IN REPORT:    Verbal report received from (name) on [facility]  being received from EP lab(unit) for routine post - op      Report consisted of patients Situation, Background, Assessment and   Recommendations(SBAR). Information from the following report(s) Procedure Summary was reviewed with the receiving nurse. Opportunity for questions and clarification was provided. Assessment completed upon patients arrival to unit and care assumed. 1403: Patient received from EP lab. Patient with gauze and tegederm to bilateral groin sites. Sites clean, dry and intact. No hematoma. VS stable. Patient with no complaints at this time. HOB flat. 1500: Patient HOB elevated 30 degrees. Sites clean, dry and intact. No hematoma. VS stable. Patient with pain to lower back post procedure. Repositioned with pillows. Reports improvement. 1530: Patient HOB elevated 45 degrees. Sites clean, dry and intact. No hematoma. VS stable.  Patient with no complaints at this time.    2740: Discharge instructions and prescriptions reviewed with Patient and her . Opportunity provided for questions. Patient and  verbalized understanding. Signed copy of discharge placed in the front of patient's chart. 1600: Patient dangled on the side of the bed. Site CDI. Reports pain to right groin site. Site soft, CDI No hematoma. 1602: Patient ambulated in the hallway. Gait steady. Reports tenderness to right groin with ambulation. Site CDI. No hematoma. Tylenol given. 1610: IV and tele removed. 1620: Patient escorted via wheelchair to entrance. Patient  driving. Patient discharged into care of .

## 2023-01-25 LAB
ATRIAL RATE: 68 BPM
CALCULATED P AXIS, ECG09: 23 DEGREES
CALCULATED R AXIS, ECG10: 41 DEGREES
CALCULATED T AXIS, ECG11: 27 DEGREES
DIAGNOSIS, 93000: NORMAL
P-R INTERVAL, ECG05: 188 MS
Q-T INTERVAL, ECG07: 414 MS
QRS DURATION, ECG06: 76 MS
QTC CALCULATION (BEZET), ECG08: 440 MS
VENTRICULAR RATE, ECG03: 68 BPM

## 2023-02-28 ENCOUNTER — OFFICE VISIT (OUTPATIENT)
Dept: CARDIOLOGY CLINIC | Age: 48
End: 2023-02-28
Payer: COMMERCIAL

## 2023-02-28 VITALS
SYSTOLIC BLOOD PRESSURE: 108 MMHG | WEIGHT: 151 LBS | HEIGHT: 68 IN | DIASTOLIC BLOOD PRESSURE: 66 MMHG | BODY MASS INDEX: 22.88 KG/M2

## 2023-02-28 DIAGNOSIS — I47.1 SVT (SUPRAVENTRICULAR TACHYCARDIA) (HCC): Primary | ICD-10-CM

## 2023-02-28 DIAGNOSIS — C50.919 MALIGNANT NEOPLASM OF FEMALE BREAST, UNSPECIFIED ESTROGEN RECEPTOR STATUS, UNSPECIFIED LATERALITY, UNSPECIFIED SITE OF BREAST (HCC): ICD-10-CM

## 2023-02-28 PROCEDURE — 93000 ELECTROCARDIOGRAM COMPLETE: CPT | Performed by: INTERNAL MEDICINE

## 2023-02-28 PROCEDURE — 99213 OFFICE O/P EST LOW 20 MIN: CPT | Performed by: INTERNAL MEDICINE

## 2023-02-28 NOTE — PROGRESS NOTES
Cardiac Electrophysiology OFFICE Follow-up Note       Assessment/Plan:   1. SVT (supraventricular tachycardia) (HCC)  -     AMB POC EKG ROUTINE W/ 12 LEADS, INTER & REP  2. Malignant neoplasm of female breast, unspecified estrogen receptor status, unspecified laterality, unspecified site of breast ([address]       AVNRT  History of paroxysmal rapid heart rate more than 200 bpm.  Precipitated by exercise. No evidence of preexcitation on EKG. Denies of any syncope. Ongoing episodes with clear precipitating factors exercise.  -Event monitor in November demonstrated paroxysmal episodes of narrow complex tachycardia/SVT with heart rate at 194 bpm.  Typically precipitated by exercise. This is limiting her functional capacity.  - TTE from 11/3/22 demonstrated LVEF of 55-60%, mild MR, mild LA dilation  - S/p EPS with evidence of dual AV efren physiology without inducible SVT despite aggressive program stim down to 400/200/200/200 ms on and off of isuprel. Now s/p successful slow pathway modification ablation on 1/23/23.(Franks)  - stop Aspirin today  - obtain 7 days event monitor in 1 month  - FU with me in 3 months    Breast cancer  Status post prior lumpectomy, chemotherapy, XRT    Subjective:       Ambreen Morales is a 52 y.o. patient who is seen for evaluation of palpitations and tachycardia. History of paroxysmal rapid heart rate more than 200 bpm.  Precipitated by exercise. No evidence of preexcitation on EKG. Denies of any syncope. Ongoing episodes with clear precipitating factors exercise. No history of chest pain. Event monitor in November demonstrated paroxysmal episodes of narrow complex tachycardia/SVT with heart rate at 194 bpm.  S/p EPS with evidence of dual AV efren physiology without inducible SVT despite aggressive program stim down to 400/200/200/200 ms on and off of isuprel. Now s/p successful slow pathway modification ablation on 1/23/23.  Reports occasoinal palpitations with -200 with exercise. Feels well otherwise. 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 letrozole (FEMARA) 2.5 mg tablet Take 2.5 mg by mouth daily. desvenlafaxine succinate (PRISTIQ) 50 mg ER tablet Take 50 mg by mouth daily. No Known Allergies  Past Medical History:   Diagnosis Date    Arm fracture     X2 as a child    Endometriosis     Pregnancy induced hypertension     Stress fracture of pelvis 2014     left inferior pubic ramus     Past Surgical History:   Procedure Laterality Date    HX HEENT      HX OTHER SURGICAL      Root Canal    HX OTHER SURGICAL      Mole Removal    HX OVARIAN CYST REMOVAL      HX PELVIC LAPAROSCOPY       Family History   Problem Relation Age of Onset    Heart Disease Mother     Diabetes Brother     Depression Father     OSTEOARTHRITIS Father     Osteoporosis Maternal Grandmother      Social History     Tobacco Use    Smoking status: Never    Smokeless tobacco: Never   Substance Use Topics    Alcohol use: Yes     Alcohol/week: 2.5 - 4.2 standard drinks     Types: 3 - 5 Glasses of wine per week        Review of Systems:   12 point review of systems was performed.  All negative except for HPI     Objective:     Visit Vitals  /66   Ht 5' 8\" (1.727 m)   Wt 151 lb (68.5 kg)   BMI 22.96 kg/m²          Physical Exam:   General:  Alert and oriented, in no acute distress  Head:  Atraumatic, normocephalic  Eyes:  extraocular muscles intact  Neck:  Supple, normal range of motion  Lungs:  Clear to auscultation bilaterally, no wheezes/rales/rhonchi Cardiovascular:  Regular rate and rhythm, normal S1-S2, no murmurs/rubs/gallops  Abdomen:  Soft, nontender, nondistended, normoactive bowel sounds  Skin:  Intact, no rash  Extremities:, no clubbing, cyanosis, or edema  Musculoskeletal: normal range of motion  Neurological:  Alert and oriented, no focal neurologic deficits  Psychiatric:  Normal mood and affect    Lab Results   Component Value Date/Time    Hemoglobin A1c 5.5 10/27/2016 10:29 AM     EKG: Normal sinus rhythm, rate of 67 bpm, no evidence of preexcitation, normal axis        Results for orders placed or performed during the hospital encounter of 01/23/23   EKG, 12 LEAD, INITIAL   Result Value Ref Range    Ventricular Rate 68 BPM    Atrial Rate 68 BPM    P-R Interval 188 ms    QRS Duration 76 ms    Q-T Interval 414 ms    QTC Calculation (Bezet) 440 ms    Calculated P Axis 23 degrees    Calculated R Axis 41 degrees    Calculated T Axis 27 degrees    Diagnosis       Normal sinus rhythm  Septal infarct , age undetermined  Abnormal ECG  No previous ECGs available  Confirmed by Josie Scanlon M.D., Isacc Gates ([id number]) on 1/25/2023 4:01:01 PM               Thank you for involving me in this patient's care and please call with further concerns or questions. ________________________________________  An Garima Crystal MD, [facility]  Cardiac Electrophysiology  [facility] and Vascular [place]  [address] [place] [address]                             [phone]     [address] [place] [address].  [address] [place] [address], [id number] [place] [address]  [phone]

## 2023-03-21 ENCOUNTER — TELEPHONE (OUTPATIENT)
Dept: CARDIOLOGY CLINIC | Age: 48
End: 2023-03-21

## 2023-03-21 NOTE — TELEPHONE ENCOUNTER
Enrolled with [employer] - Ordered and being shipped to patient's home address on file. ETA within 5-7 business days. 7 day event monitor  Received: Today  Jean Paul Reddy  Can you please send her a 7 day event monitor for SVT per Dr. India Lobato?   Thank you!!

## 2023-05-15 ENCOUNTER — TELEPHONE (OUTPATIENT)
Age: 48
End: 2023-05-15

## 2023-08-29 ENCOUNTER — OFFICE VISIT (OUTPATIENT)
Age: 48
End: 2023-08-29
Payer: COMMERCIAL

## 2023-08-29 VITALS
SYSTOLIC BLOOD PRESSURE: 112 MMHG | HEIGHT: 68 IN | WEIGHT: 155 LBS | BODY MASS INDEX: 23.49 KG/M2 | OXYGEN SATURATION: 97 % | HEART RATE: 68 BPM | DIASTOLIC BLOOD PRESSURE: 70 MMHG

## 2023-08-29 DIAGNOSIS — I47.1 SVT (SUPRAVENTRICULAR TACHYCARDIA) (HCC): Primary | ICD-10-CM

## 2023-08-29 DIAGNOSIS — C50.919 MALIGNANT NEOPLASM OF FEMALE BREAST, UNSPECIFIED ESTROGEN RECEPTOR STATUS, UNSPECIFIED LATERALITY, UNSPECIFIED SITE OF BREAST (HCC): ICD-10-CM

## 2023-08-29 PROCEDURE — 99214 OFFICE O/P EST MOD 30 MIN: CPT | Performed by: INTERNAL MEDICINE

## 2023-08-29 PROCEDURE — 93000 ELECTROCARDIOGRAM COMPLETE: CPT | Performed by: INTERNAL MEDICINE

## 2023-08-29 RX ORDER — NYSTATIN 100000 [USP'U]/G
POWDER TOPICAL 2 TIMES DAILY
COMMUNITY
Start: 2023-01-19 | End: 2024-01-19

## 2023-08-29 RX ORDER — EXEMESTANE 25 MG/1
TABLET ORAL
COMMUNITY
Start: 2023-07-28

## 2023-08-29 RX ORDER — AZELASTINE 1 MG/ML
SPRAY, METERED NASAL 2 TIMES DAILY
COMMUNITY

## 2023-08-29 ASSESSMENT — PATIENT HEALTH QUESTIONNAIRE - PHQ9
1. LITTLE INTEREST OR PLEASURE IN DOING THINGS: 0
SUM OF ALL RESPONSES TO PHQ QUESTIONS 1-9: 0
2. FEELING DOWN, DEPRESSED OR HOPELESS: 0
SUM OF ALL RESPONSES TO PHQ QUESTIONS 1-9: 0
SUM OF ALL RESPONSES TO PHQ9 QUESTIONS 1 & 2: 0

## 2023-08-29 NOTE — PROGRESS NOTES
01/20/2023 12:47 PM     No results found for: HBA1C  Lab Results   Component Value Date/Time    HGB 13.3 01/20/2023 12:47 PM     Lab Results   Component Value Date/Time     01/20/2023 12:47 PM     No results for input(s): CPK, CKMB in the last 72 hours. Invalid input(s): CKQMB, CPKMB, TROIQ             ___________________________________________________    An Chris Mccallum MD, [facility], [facility]  Cardiac Electrophysiology  [facility] and Vascular [place]  [address] [place], [address]                             [phone]     [facility].  [address] [facility] Yolanda Mckeon  [phone]

## 2024-08-29 ENCOUNTER — OFFICE VISIT (OUTPATIENT)
Age: 49
End: 2024-08-29
Payer: COMMERCIAL

## 2024-08-29 VITALS
BODY MASS INDEX: 23.64 KG/M2 | SYSTOLIC BLOOD PRESSURE: 110 MMHG | HEIGHT: 68 IN | WEIGHT: 156 LBS | HEART RATE: 73 BPM | DIASTOLIC BLOOD PRESSURE: 76 MMHG

## 2024-08-29 DIAGNOSIS — C50.919 MALIGNANT NEOPLASM OF FEMALE BREAST, UNSPECIFIED ESTROGEN RECEPTOR STATUS, UNSPECIFIED LATERALITY, UNSPECIFIED SITE OF BREAST (HCC): ICD-10-CM

## 2024-08-29 DIAGNOSIS — I47.10 SVT (SUPRAVENTRICULAR TACHYCARDIA) (HCC): Primary | ICD-10-CM

## 2024-08-29 PROCEDURE — 99213 OFFICE O/P EST LOW 20 MIN: CPT | Performed by: INTERNAL MEDICINE

## 2024-08-29 PROCEDURE — 93000 ELECTROCARDIOGRAM COMPLETE: CPT | Performed by: INTERNAL MEDICINE

## 2024-08-29 NOTE — PROGRESS NOTES
11/03/22    TRANSTHORACIC ECHOCARDIOGRAM (TTE) COMPLETE (CONTRAST/BUBBLE/3D PRN) 11/04/2022  8:12 PM, 11/04/2022 12:00 AM (Final)    Narrative  This is a summary report. The complete report is available in the patient's medical record. If you cannot access the medical record, please contact the sending organization for a detailed fax or copy.      Left Ventricle: Normal left ventricular systolic function with a visually estimated EF of 55 - 60%. EF by 2D Simpsons Biplane is 56%. Left ventricle size is normal. Normal wall thickness. Normal wall motion. Global longitudinal strain is -19.8%. Normal diastolic function.    Mitral Valve: Mild regurgitation.    Left Atrium: Left atrium is mildly dilated. LA Vol Index A/L is 40 mL/m2.    Tricuspid Valve: The estimated RVSP is 24 mmHg.    Signed by: Elizabeth Salas MD on 11/4/2022  8:12 PM, Signed by: Unknown Provider Result on 11/4/2022 12:00 AM    No results found for this or any previous visit.    No results found for this or any previous visit.    [unfilled]   No specialty comments available.      Lab Review:   No results found for: \"CHOL\", \"CHLST\", \"CHOLV\", \"420848\", \"HDL\", \"HDLC\", \"LDL\"  No results found for: \"VALDEMAR\", \"CREAPOC\"  Lab Results   Component Value Date/Time    BUN 10 01/20/2023 12:47 PM     Lab Results   Component Value Date/Time    K 4.6 01/20/2023 12:47 PM     No results found for: \"HBA1C\"  Lab Results   Component Value Date/Time    HGB 13.3 01/20/2023 12:47 PM     Lab Results   Component Value Date/Time     01/20/2023 12:47 PM     No results for input(s): \"CPK\", \"CKMB\" in the last 72 hours.    Invalid input(s): \"CKQMB\", \"CPKMB\", \"TROIQ\"             ___________________________________________________    An Iván March MD, FACC, FHRS  Cardiac Electrophysiology  [facility] Heart and Vascular [place]  [address] [place] [address] [phone] [address] [facility]. [address]  [place] [address] [phone]

## 2025-07-31 ENCOUNTER — TRANSCRIBE ORDERS (OUTPATIENT)
Facility: HOSPITAL | Age: 50
End: 2025-07-31

## 2025-07-31 DIAGNOSIS — Z85.3 HISTORY OF BREAST CANCER: ICD-10-CM

## 2025-07-31 DIAGNOSIS — R10.2 PELVIC PAIN: Primary | ICD-10-CM

## 2025-08-01 ENCOUNTER — HOSPITAL ENCOUNTER (OUTPATIENT)
Facility: HOSPITAL | Age: 50
Discharge: HOME | End: 2025-08-01
Payer: COMMERCIAL

## 2025-08-01 DIAGNOSIS — Z85.3 HISTORY OF BREAST CANCER: ICD-10-CM

## 2025-08-01 DIAGNOSIS — R10.2 PELVIC PAIN: ICD-10-CM

## 2025-08-01 PROCEDURE — 6360000004 HC RX CONTRAST MEDICATION: Performed by: FAMILY MEDICINE

## 2025-08-01 PROCEDURE — 74177 CT ABD & PELVIS W/CONTRAST: CPT

## 2025-08-01 RX ORDER — IOPAMIDOL 755 MG/ML
100 INJECTION, SOLUTION INTRAVASCULAR
Status: COMPLETED | OUTPATIENT
Start: 2025-08-01 | End: 2025-08-01

## 2025-08-01 RX ADMIN — IOPAMIDOL 100 ML: 755 INJECTION, SOLUTION INTRAVENOUS at 08:46

## (undated) DEVICE — NEEDLE ANGIO 18GA L9CM NRML 1 WALL SMOOTH FINISH CLR HUB FOR

## (undated) DEVICE — CATHETER ELECTROPHYSIOLOGY CRD 2 10 MM 5 FRX120 CM QPLR 1 MM

## (undated) DEVICE — CABLE EXT EPS B/T/BLK 150CM --

## (undated) DEVICE — MEDI-TRACE CADENCE ADULT, DEFIBRILLATION ELECTRODE -RTS  (10 PR/PK) - PHYSIO-CONTROL: Brand: MEDI-TRACE CADENCE

## (undated) DEVICE — CATHETER EP 7FR L115CM 2-8-2MM SPC TIP 2MM FJ CRV ADV COMPR

## (undated) DEVICE — CATH 4MM NAVIGATIONAL BI-DIREC --

## (undated) DEVICE — ELECTRODE PT RET AD L9FT HI MOIST COND ADH HYDRGEL CORDED

## (undated) DEVICE — PINNACLE INTRODUCER SHEATH: Brand: PINNACLE

## (undated) DEVICE — CABLE RMFG EP C3 10/BLK 12YEL -- F/CARTO 3 SYS - OEM 323592

## (undated) DEVICE — PATCH CARTO 3 EXT REF --

## (undated) DEVICE — CABLE RMFG EP MAP NAVISTAR RED -- F/CARTO 3 SYS - OEM 323590

## (undated) DEVICE — CATHETER EP 6FR L92CM 2-5-2MM SPC TIP 1MM 4 ELECTRD D CRV

## (undated) DEVICE — CABLE RMFG DECA 34/LBLU 10/BLK -- F/CARTO 3 SYS - OEM 332259

## (undated) DEVICE — CATHETER ELECTROPHYSIOLOGY D 2-5-2 MM 1 MM 6 FRX115 CM 4 FIX

## (undated) DEVICE — PERCLOSE PROGLIDE™ SUTURE-MEDIATED CLOSURE SYSTEM: Brand: PERCLOSE PROGLIDE™